# Patient Record
Sex: MALE | Race: OTHER | NOT HISPANIC OR LATINO | URBAN - METROPOLITAN AREA
[De-identification: names, ages, dates, MRNs, and addresses within clinical notes are randomized per-mention and may not be internally consistent; named-entity substitution may affect disease eponyms.]

---

## 2021-06-06 ENCOUNTER — EMERGENCY (EMERGENCY)
Facility: HOSPITAL | Age: 71
LOS: 1 days | Discharge: HOME | End: 2021-06-06
Attending: STUDENT IN AN ORGANIZED HEALTH CARE EDUCATION/TRAINING PROGRAM
Payer: COMMERCIAL

## 2021-06-06 VITALS
DIASTOLIC BLOOD PRESSURE: 111 MMHG | HEIGHT: 67 IN | SYSTOLIC BLOOD PRESSURE: 149 MMHG | TEMPERATURE: 98 F | HEART RATE: 76 BPM | RESPIRATION RATE: 16 BRPM | OXYGEN SATURATION: 99 % | WEIGHT: 160.06 LBS

## 2021-06-06 VITALS
DIASTOLIC BLOOD PRESSURE: 85 MMHG | OXYGEN SATURATION: 100 % | SYSTOLIC BLOOD PRESSURE: 123 MMHG | HEART RATE: 81 BPM | RESPIRATION RATE: 18 BRPM

## 2021-06-06 PROCEDURE — 70450 CT HEAD/BRAIN W/O DYE: CPT

## 2021-06-06 PROCEDURE — 73110 X-RAY EXAM OF WRIST: CPT

## 2021-06-06 PROCEDURE — 73502 X-RAY EXAM HIP UNI 2-3 VIEWS: CPT | Mod: 26,LT

## 2021-06-06 PROCEDURE — 73552 X-RAY EXAM OF FEMUR 2/>: CPT

## 2021-06-06 PROCEDURE — 70486 CT MAXILLOFACIAL W/O DYE: CPT

## 2021-06-06 PROCEDURE — 72125 CT NECK SPINE W/O DYE: CPT | Mod: 26,MG

## 2021-06-06 PROCEDURE — 70450 CT HEAD/BRAIN W/O DYE: CPT | Mod: 26,MG

## 2021-06-06 PROCEDURE — 99285 EMERGENCY DEPT VISIT HI MDM: CPT

## 2021-06-06 PROCEDURE — 73130 X-RAY EXAM OF HAND: CPT | Mod: 26,LT

## 2021-06-06 PROCEDURE — 73110 X-RAY EXAM OF WRIST: CPT | Mod: 26,LT

## 2021-06-06 PROCEDURE — 99284 EMERGENCY DEPT VISIT MOD MDM: CPT | Mod: 25

## 2021-06-06 PROCEDURE — 90471 IMMUNIZATION ADMIN: CPT

## 2021-06-06 PROCEDURE — 70486 CT MAXILLOFACIAL W/O DYE: CPT | Mod: 26,MG

## 2021-06-06 PROCEDURE — 73130 X-RAY EXAM OF HAND: CPT

## 2021-06-06 PROCEDURE — 90715 TDAP VACCINE 7 YRS/> IM: CPT

## 2021-06-06 PROCEDURE — 76377 3D RENDER W/INTRP POSTPROCES: CPT

## 2021-06-06 PROCEDURE — 73630 X-RAY EXAM OF FOOT: CPT

## 2021-06-06 PROCEDURE — 72125 CT NECK SPINE W/O DYE: CPT

## 2021-06-06 PROCEDURE — 73630 X-RAY EXAM OF FOOT: CPT | Mod: 26,RT

## 2021-06-06 PROCEDURE — 76377 3D RENDER W/INTRP POSTPROCES: CPT | Mod: 26

## 2021-06-06 PROCEDURE — G1004: CPT

## 2021-06-06 PROCEDURE — 72170 X-RAY EXAM OF PELVIS: CPT

## 2021-06-06 PROCEDURE — 73552 X-RAY EXAM OF FEMUR 2/>: CPT | Mod: 26,LT

## 2021-06-06 PROCEDURE — 73502 X-RAY EXAM HIP UNI 2-3 VIEWS: CPT

## 2021-06-06 RX ORDER — TETANUS TOXOID, REDUCED DIPHTHERIA TOXOID AND ACELLULAR PERTUSSIS VACCINE, ADSORBED 5; 2.5; 8; 8; 2.5 [IU]/.5ML; [IU]/.5ML; UG/.5ML; UG/.5ML; UG/.5ML
0.5 SUSPENSION INTRAMUSCULAR ONCE
Refills: 0 | Status: COMPLETED | OUTPATIENT
Start: 2021-06-06 | End: 2021-06-06

## 2021-06-06 RX ORDER — ACETAMINOPHEN 500 MG
650 TABLET ORAL ONCE
Refills: 0 | Status: COMPLETED | OUTPATIENT
Start: 2021-06-06 | End: 2021-06-06

## 2021-06-06 RX ORDER — IBUPROFEN 200 MG
600 TABLET ORAL ONCE
Refills: 0 | Status: COMPLETED | OUTPATIENT
Start: 2021-06-06 | End: 2021-06-06

## 2021-06-06 RX ADMIN — Medication 650 MILLIGRAM(S): at 19:44

## 2021-06-06 RX ADMIN — TETANUS TOXOID, REDUCED DIPHTHERIA TOXOID AND ACELLULAR PERTUSSIS VACCINE, ADSORBED 0.5 MILLILITER(S): 5; 2.5; 8; 8; 2.5 SUSPENSION INTRAMUSCULAR at 19:45

## 2021-06-06 RX ADMIN — Medication 600 MILLIGRAM(S): at 19:45

## 2021-06-06 NOTE — ED PROVIDER NOTE - NSFOLLOWUPCLINICS_GEN_ALL_ED_FT
Specialty Clinics  Podiatry  [address] - [address] [place] [address]  Phone: [phone]  Fax:   Follow Up Time: Routine    [facility]  Orthopedic Surgery  [address] [address]  Phone: [phone]  Fax:
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
## 2021-06-06 NOTE — ED PROVIDER NOTE - NS ED ROS FT
Constitutional: no fevers no chills.   CV: no chest pain, no palpitations   Respiratory: no shortness of breath, no cough   GI: no abdominal pain, no nausea no vomiting   Neuro: no headache, no weakness, no numbness Constitutional: no fevers no chills.   CV: no chest pain, no palpitations   HEENT: no vision changes, no neck pain,   Respiratory: no shortness of breath, no cough   GI: no abdominal pain, no nausea no vomiting   Neuro: no headache, no weakness, no numbness  MSK: + hip pain, toe pain,   all other ROS is negative except as documented as HPI.
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
## 2021-06-06 NOTE — ED PROVIDER NOTE - PHYSICAL EXAMINATION
I have reviewed the triage vital signs.    Const: Well-nourished, Well-developed, appearing stated age  Head: atraumatic, normocephalic  Eyes: no conjunctival injection and no scleral icterus  ENMT: Atraumatic external nose and ears, Moist mucus membranes, swelling over the L maxilla, w/ small abrasion,   Neck: Symmetric, trachea midline,  CVS: +S1/S2, dorsalis pedis/radial pulse 2+ bilaterally  RESP: Unlabored respiratory effort, Clear to auscultation bilaterally  GI: Nontender/Nondistended, soft abdomen  MSK: Extremities w/o deformity or ttp;   Skin: Warm, Dry w/ no rashes  Neuro: GCS=15, motor in all 4 extremities equal, Sensation grossly intact , pain when palpating the L hip  Psych: Awake, Alert, & Orientedx3;  Appropriate mood and affect, cooperative I have reviewed the triage vital signs.    Const: Well-nourished, Well-developed, appearing stated age  Head: normocephalic, L upper maxillary tenderness and swelling  Eyes: no conjunctival injection and no scleral icterus, EOMI, pupils are 4 mm and reactive bilaterally,   ENMT: Atraumatic external nose and ears, Moist mucus membranes, swelling over the L maxilla, w/ small abrasion overlying w/ no active bleeding  Neck: Symmetric, trachea midline, no c/t/l spine tenderness  CVS: +S1/S2, dorsalis pedis/radial pulse 2+ bilaterally  RESP: Unlabored respiratory effort, Clear to auscultation bilaterally  GI: Nontender/Nondistended, soft abdomen  MSK: hematoma on the L thigh approx 3 cm by 5 cm soft compartments, abrasion on the Rknee, intact flexion and extension of the bilateral knees, wrists, ankles and hands, elbows, hips, pain when palpating the  R great toe,   Neuro: GCS=15, motor in all 4 extremities equal, Sensation  intact , pain when palpating the L hip  Psych: Awake, Alert, & Orientedx3;  Appropriate mood and affect, cooperative

## 2021-06-06 NOTE — ED PROCEDURE NOTE - NS ED PERI VASCULAR NEG
fingers/toes warm to touch/no cyanosis of extremity/capillary refill time < 2 seconds
fingers/toes warm to touch/capillary refill time < 2 seconds

## 2021-06-06 NOTE — ED PROVIDER NOTE - PATIENT PORTAL LINK FT
You can access the FollowMyHealth Patient Portal offered by [facility] by registering at the following website: [email or web address]. By joining "[employer]’s FollowMyHealth portal, you will also be able to view your health information using other applications (apps) compatible with our system.

## 2021-06-06 NOTE — ED PROVIDER NOTE - CARE PLAN
Principal Discharge DX:	Facial swelling   Principal Discharge DX:	Facial swelling  Secondary Diagnosis:	Contusion  Secondary Diagnosis:	Muscle pain

## 2021-06-06 NOTE — ED PROVIDER NOTE - PROGRESS NOTE DETAILS
informed of negative test results informed of negative test results, pt ambulatory w/ out difficulty; pt to go home follow up with orthopedic for further management of joint abnormalities, daughter at bedside w/ all questions answered
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
## 2021-06-07 NOTE — ED POST DISCHARGE NOTE - RESULT SUMMARY
xray wrist lucency of the fourth proximal phalanx diaphysis. Enchondroma may be considered. Correlation with prior imaging or follow-up MRI may be helpful for further characterization.

## 2021-06-07 NOTE — ED POST DISCHARGE NOTE - DETAILS
lvm with  [phone] to please call back admin line - Chaparrita Littlejohn PA-C spoke with patients daughter Hortencia, went over results, and recommended to follow up with ortho, given clinic number and  MR phone number to get the images - Chaparrita Littlejohn PA-C
